# Patient Record
Sex: MALE | Race: BLACK OR AFRICAN AMERICAN | NOT HISPANIC OR LATINO | Employment: STUDENT | URBAN - METROPOLITAN AREA
[De-identification: names, ages, dates, MRNs, and addresses within clinical notes are randomized per-mention and may not be internally consistent; named-entity substitution may affect disease eponyms.]

---

## 2021-02-16 ENCOUNTER — HISTORICAL (OUTPATIENT)
Dept: ADMINISTRATIVE | Facility: HOSPITAL | Age: 21
End: 2021-02-16

## 2021-02-16 LAB
HAV IGM SERPL QL IA: NONREACTIVE
HBV CORE IGM SERPL QL IA: NONREACTIVE
HBV SURFACE AG SERPL QL IA: NONREACTIVE
HCV AB SERPL QL IA: NONREACTIVE
HIV 1+2 AB+HIV1 P24 AG SERPL QL IA: NONREACTIVE
T PALLIDUM AB SER QL: NONREACTIVE

## 2022-04-11 ENCOUNTER — HISTORICAL (OUTPATIENT)
Dept: ADMINISTRATIVE | Facility: HOSPITAL | Age: 22
End: 2022-04-11

## 2022-04-27 VITALS
DIASTOLIC BLOOD PRESSURE: 70 MMHG | HEIGHT: 71 IN | WEIGHT: 180.13 LBS | BODY MASS INDEX: 25.22 KG/M2 | SYSTOLIC BLOOD PRESSURE: 134 MMHG | OXYGEN SATURATION: 99 %

## 2022-07-23 ENCOUNTER — OFFICE VISIT (OUTPATIENT)
Dept: URGENT CARE | Facility: CLINIC | Age: 22
End: 2022-07-23
Payer: COMMERCIAL

## 2022-07-23 VITALS
WEIGHT: 178.13 LBS | SYSTOLIC BLOOD PRESSURE: 114 MMHG | DIASTOLIC BLOOD PRESSURE: 73 MMHG | TEMPERATURE: 98 F | RESPIRATION RATE: 18 BRPM | HEART RATE: 51 BPM | HEIGHT: 71 IN | OXYGEN SATURATION: 100 % | BODY MASS INDEX: 24.94 KG/M2

## 2022-07-23 DIAGNOSIS — H93.11 RINGING IN RIGHT EAR: ICD-10-CM

## 2022-07-23 DIAGNOSIS — Z11.3 SCREEN FOR STD (SEXUALLY TRANSMITTED DISEASE): ICD-10-CM

## 2022-07-23 DIAGNOSIS — H66.91 RIGHT OTITIS MEDIA, UNSPECIFIED OTITIS MEDIA TYPE: Primary | ICD-10-CM

## 2022-07-23 LAB
C TRACH DNA SPEC QL NAA+PROBE: DETECTED
HAV IGM SERPL QL IA: NONREACTIVE
HBV CORE IGM SERPL QL IA: NONREACTIVE
HBV SURFACE AG SERPL QL IA: NONREACTIVE
HCV AB SERPL QL IA: NONREACTIVE
HIV 1+2 AB+HIV1 P24 AG SERPL QL IA: NONREACTIVE
N GONORRHOEA DNA SPEC QL NAA+PROBE: NOT DETECTED
T PALLIDUM AB SER QL: NONREACTIVE

## 2022-07-23 PROCEDURE — 87491 CHLMYD TRACH DNA AMP PROBE: CPT | Performed by: NURSE PRACTITIONER

## 2022-07-23 PROCEDURE — 87389 HIV-1 AG W/HIV-1&-2 AB AG IA: CPT | Performed by: NURSE PRACTITIONER

## 2022-07-23 PROCEDURE — 99214 OFFICE O/P EST MOD 30 MIN: CPT | Mod: S$PBB,,, | Performed by: NURSE PRACTITIONER

## 2022-07-23 PROCEDURE — 36415 COLL VENOUS BLD VENIPUNCTURE: CPT | Performed by: NURSE PRACTITIONER

## 2022-07-23 PROCEDURE — 99214 OFFICE O/P EST MOD 30 MIN: CPT | Mod: PBBFAC | Performed by: NURSE PRACTITIONER

## 2022-07-23 PROCEDURE — 80074 ACUTE HEPATITIS PANEL: CPT | Performed by: NURSE PRACTITIONER

## 2022-07-23 PROCEDURE — 86780 TREPONEMA PALLIDUM: CPT | Performed by: NURSE PRACTITIONER

## 2022-07-23 PROCEDURE — 87591 N.GONORRHOEAE DNA AMP PROB: CPT | Performed by: NURSE PRACTITIONER

## 2022-07-23 PROCEDURE — 99214 PR OFFICE/OUTPT VISIT, EST, LEVL IV, 30-39 MIN: ICD-10-PCS | Mod: S$PBB,,, | Performed by: NURSE PRACTITIONER

## 2022-07-23 RX ORDER — LEVOCETIRIZINE DIHYDROCHLORIDE 5 MG/1
5 TABLET, FILM COATED ORAL NIGHTLY
Qty: 14 TABLET | Refills: 0 | Status: SHIPPED | OUTPATIENT
Start: 2022-07-23 | End: 2022-08-06

## 2022-07-23 RX ORDER — AMOXICILLIN 875 MG/1
875 TABLET, FILM COATED ORAL 2 TIMES DAILY
Qty: 20 TABLET | Refills: 0 | Status: SHIPPED | OUTPATIENT
Start: 2022-07-23 | End: 2022-08-02

## 2022-07-23 NOTE — PROGRESS NOTES
"Subjective:       Patient ID: Jez Demarco is a 21 y.o. male.    Vitals:  height is 5' 10.87" (1.8 m) and weight is 80.8 kg (178 lb 1.6 oz). His oral temperature is 98.4 °F (36.9 °C). His blood pressure is 114/73 and his pulse is 51 (abnormal). His respiration is 18 and oxygen saturation is 100%.     Chief Complaint: Otalgia (Pt reports that he [place] and he used the earplugs there. He feels as though he "pushed it in too far" because now he is having right sided ear pain. Patient also reports that the ear plug is not stuck in his ear.)    Patient is a 21-year-old male, here today for right ear pain, ringing to right ear.  Patient states he went to the gun range several days ago, used ear plugs and when he took the ear plug out he started with ear pain and ringing.  Patient states hearing to the right. Also requesting std screening.       Constitution: Negative.   HENT: Positive for ear pain and tinnitus.    Neck: neck negative.   Cardiovascular: Negative.    Respiratory: Negative.        Objective:      Physical Exam   Constitutional: He is oriented to person, place, and time. He appears well-developed.   HENT:   Head: Normocephalic.   Ears:   Right Ear: A middle ear effusion is present.   Left Ear: Tympanic membrane normal.   Nose: Nose normal.   Eyes: Conjunctivae and EOM are normal. Pupils are equal, round, and reactive to light.   Neck: Neck supple.   Cardiovascular: Normal rate, regular rhythm and normal heart sounds.   Pulmonary/Chest: Effort normal and breath sounds normal.   Musculoskeletal: Normal range of motion.         General: Normal range of motion.   Neurological: He is alert and oriented to person, place, and time.   Skin: Skin is warm and dry. Capillary refill takes less than 2 seconds.   Psychiatric: His behavior is normal.   Vitals reviewed.        Assessment:       1. Right otitis media, unspecified otitis media type    2. Ringing in right ear    3. Screen for STD (sexually " transmitted disease)             No results found.   Plan:         Medication as ordered. May use otc decongestant as discussed. Will refer to Dr. Smyth ENT for ringing to R ear, decreased hearing. Pt given information to call their office to check on referral.   STD panel done and sent to lab, will notify pt of abnormal results. Practice safe sex. F/u with pcp. ER precautions.         Right otitis media, unspecified otitis media type  -     amoxicillin (AMOXIL) 875 MG tablet; Take 1 tablet (875 mg total) by mouth 2 (two) times daily. for 10 days  Dispense: 20 tablet; Refill: 0  -     levocetirizine (XYZAL) 5 MG tablet; Take 1 tablet (5 mg total) by mouth every evening. for 14 days  Dispense: 14 tablet; Refill: 0    Ringing in right ear  -     Ambulatory referral/consult to ENT    Screen for STD (sexually transmitted disease)  -     Trichomonas vaginalis, RNA, Qual, Urine  -     Chlamydia/GC, PCR  -     HIV 1/2 Ag/Ab (4th Gen)  -     Hepatitis Panel, Acute  -     SYPHILIS ANTIBODY (WITH REFLEX RPR)

## 2022-07-23 NOTE — LETTER
July 23, 2022      [facility]  [address] [address]  Phone: [phone]       Patient: Jez Demarco   YOB: 2000  Date of Visit: 07/23/2022    To Whom It May Concern:    Karishma Demarco  was at [facility] on 07/23/2022. The patient may return to work/school on 07/24/2022 with no restrictions. If you have any questions or concerns, or if I can be of further assistance, please do not hesitate to contact me.    Sincerely,    Katrina Clark LPN

## 2022-07-25 RX ORDER — DOXYCYCLINE HYCLATE 100 MG
100 TABLET ORAL 2 TIMES DAILY
Qty: 14 TABLET | Refills: 0 | Status: SHIPPED | OUTPATIENT
Start: 2022-07-25 | End: 2022-08-01

## 2022-07-26 ENCOUNTER — TELEPHONE (OUTPATIENT)
Dept: URGENT CARE | Facility: CLINIC | Age: 22
End: 2022-07-26

## 2022-07-26 LAB — PATH REV: NORMAL

## 2022-07-26 NOTE — TELEPHONE ENCOUNTER
----- Message from Mckay Flores MD sent at 7/25/2022  9:11 AM CDT -----  Please notify patient of positive chlamydia test.  Notify partners to get test/treated.  Fill doxycycline as prescribed.  Avoid sexual activity for at least 7 days after completing therapy.

## 2022-12-10 ENCOUNTER — OFFICE VISIT (OUTPATIENT)
Dept: URGENT CARE | Facility: CLINIC | Age: 22
End: 2022-12-10
Payer: COMMERCIAL

## 2022-12-10 VITALS
OXYGEN SATURATION: 97 % | HEIGHT: 71 IN | HEART RATE: 68 BPM | RESPIRATION RATE: 20 BRPM | TEMPERATURE: 98 F | SYSTOLIC BLOOD PRESSURE: 144 MMHG | BODY MASS INDEX: 24.06 KG/M2 | WEIGHT: 171.88 LBS | DIASTOLIC BLOOD PRESSURE: 75 MMHG

## 2022-12-10 DIAGNOSIS — Z11.3 SCREEN FOR STD (SEXUALLY TRANSMITTED DISEASE): Primary | ICD-10-CM

## 2022-12-10 LAB
C TRACH DNA SPEC QL NAA+PROBE: NOT DETECTED
HAV IGM SERPL QL IA: NONREACTIVE
HBV CORE IGM SERPL QL IA: NONREACTIVE
HBV SURFACE AG SERPL QL IA: NONREACTIVE
HCV AB SERPL QL IA: NONREACTIVE
HIV 1+2 AB+HIV1 P24 AG SERPL QL IA: NONREACTIVE
N GONORRHOEA DNA SPEC QL NAA+PROBE: NOT DETECTED
T PALLIDUM AB SER QL: NONREACTIVE

## 2022-12-10 PROCEDURE — 87491 CHLMYD TRACH DNA AMP PROBE: CPT | Performed by: NURSE PRACTITIONER

## 2022-12-10 PROCEDURE — 87389 HIV-1 AG W/HIV-1&-2 AB AG IA: CPT | Performed by: NURSE PRACTITIONER

## 2022-12-10 PROCEDURE — 99213 OFFICE O/P EST LOW 20 MIN: CPT | Mod: S$PBB,,, | Performed by: NURSE PRACTITIONER

## 2022-12-10 PROCEDURE — 80074 ACUTE HEPATITIS PANEL: CPT | Performed by: NURSE PRACTITIONER

## 2022-12-10 PROCEDURE — 87661 TRICHOMONAS VAGINALIS AMPLIF: CPT | Performed by: NURSE PRACTITIONER

## 2022-12-10 PROCEDURE — 87591 N.GONORRHOEAE DNA AMP PROB: CPT | Performed by: NURSE PRACTITIONER

## 2022-12-10 PROCEDURE — 36415 COLL VENOUS BLD VENIPUNCTURE: CPT | Performed by: NURSE PRACTITIONER

## 2022-12-10 PROCEDURE — 99213 PR OFFICE/OUTPT VISIT, EST, LEVL III, 20-29 MIN: ICD-10-PCS | Mod: S$PBB,,, | Performed by: NURSE PRACTITIONER

## 2022-12-10 PROCEDURE — 99213 OFFICE O/P EST LOW 20 MIN: CPT | Mod: PBBFAC | Performed by: NURSE PRACTITIONER

## 2022-12-10 PROCEDURE — 86780 TREPONEMA PALLIDUM: CPT | Performed by: NURSE PRACTITIONER

## 2022-12-10 RX ORDER — ALBUTEROL SULFATE 90 UG/1
AEROSOL, METERED RESPIRATORY (INHALATION)
COMMUNITY

## 2022-12-10 NOTE — PROGRESS NOTES
"Subjective:       Patient ID: Jez Demarco is a 22 y.o. male.    Vitals:  height is 5' 11" (1.803 m) and weight is 78 kg (171 lb 14.4 oz). His oral temperature is 98.1 °F (36.7 °C). His blood pressure is 144/75 (abnormal) and his pulse is 68. His respiration is 20 and oxygen saturation is 97%.     Chief Complaint: Exposure to STD (Pt sts he feels the urge to be often, unsure if expose. )    Patient is a 22-year-old male, here today for STD screening.  Patient states he was previously treated for chlamydia, states his partner got treated also.  States he thinks he feels the urge to urinate often, no other symptoms.      Constitution: Negative.   Cardiovascular: Negative.    Respiratory: Negative.     Gastrointestinal: Negative.    Genitourinary:  Positive for frequency.     Objective:      Physical Exam   Constitutional: He is oriented to person, place, and time. He appears well-developed.   HENT:   Head: Normocephalic.   Eyes: Conjunctivae and EOM are normal. Pupils are equal, round, and reactive to light.   Neck: Neck supple.   Cardiovascular: Normal rate, regular rhythm and normal heart sounds.   Pulmonary/Chest: Effort normal and breath sounds normal.   Abdominal: Bowel sounds are normal. Soft.   Musculoskeletal: Normal range of motion.         General: Normal range of motion.   Neurological: He is alert and oriented to person, place, and time.   Skin: Skin is warm and dry.   Psychiatric: His behavior is normal.   Vitals reviewed.      Assessment:       1. Screen for STD (sexually transmitted disease)            No visits with results within 1 Day(s) from this visit.   Latest known visit with results is:   Office Visit on 07/23/2022   Component Date Value Ref Range Status    Chlamydia trachomatis PCR 07/23/2022 Detected (A)  Not Detected Final    N. gonorrhea PCR 07/23/2022 Not Detected  Not Detected Final    HIV 07/23/2022 Nonreactive  Nonreactive Final    Hepatitis A IgM 07/23/2022 Nonreactive  Nonreactive " Final    Hepatitis B Core IgM 07/23/2022 Nonreactive  Nonreactive Final    Hepatitis B Surface Antigen 07/23/2022 Nonreactive  Nonreactive Final    Hepatitis C Antibody 07/23/2022 Nonreactive  Nonreactive Final    Syphilis Antibody 07/23/2022 Nonreactive  Nonreactive, Equivocal Final    Pathology Review 07/23/2022 No serological evidence of recent or past hepatitis A, B, or C infection.    Noy Jaramillo MD     Final        No results found.   Plan:         STD panel done and sent to lab, will notify pt of abnormal results. Practice safe sex. F/u with pcp. ER precautions.       Screen for STD (sexually transmitted disease)  -     Chlamydia/GC, PCR  -     HIV 1/2 Ag/Ab (4th Gen)  -     Hepatitis Panel, Acute  -     SYPHILIS ANTIBODY (WITH REFLEX RPR)  -     T.vaginalisisc, Amplified RNA

## 2022-12-13 LAB — PATH REV: NORMAL

## 2022-12-15 LAB
SPECIMEN SOURCE: NORMAL
T VAGINALIS RRNA SPEC QL NAA+PROBE: NEGATIVE